# Patient Record
Sex: MALE | Race: OTHER | NOT HISPANIC OR LATINO
[De-identification: names, ages, dates, MRNs, and addresses within clinical notes are randomized per-mention and may not be internally consistent; named-entity substitution may affect disease eponyms.]

---

## 2022-10-19 PROBLEM — Z00.00 ENCOUNTER FOR PREVENTIVE HEALTH EXAMINATION: Status: ACTIVE | Noted: 2022-10-19

## 2022-10-25 ENCOUNTER — APPOINTMENT (OUTPATIENT)
Dept: UROLOGY | Facility: CLINIC | Age: 57
End: 2022-10-25

## 2022-10-25 DIAGNOSIS — I10 ESSENTIAL (PRIMARY) HYPERTENSION: ICD-10-CM

## 2022-10-25 DIAGNOSIS — Z82.49 FAMILY HISTORY OF ISCHEMIC HEART DISEASE AND OTHER DISEASES OF THE CIRCULATORY SYSTEM: ICD-10-CM

## 2022-10-25 DIAGNOSIS — F17.200 NICOTINE DEPENDENCE, UNSPECIFIED, UNCOMPLICATED: ICD-10-CM

## 2022-10-25 DIAGNOSIS — Z80.0 FAMILY HISTORY OF MALIGNANT NEOPLASM OF DIGESTIVE ORGANS: ICD-10-CM

## 2022-10-25 DIAGNOSIS — Z78.9 OTHER SPECIFIED HEALTH STATUS: ICD-10-CM

## 2022-10-25 PROCEDURE — 99204 OFFICE O/P NEW MOD 45 MIN: CPT | Mod: 95

## 2022-10-25 RX ORDER — AMLODIPINE BESYLATE 5 MG/1
5 TABLET ORAL
Qty: 30 | Refills: 0 | Status: ACTIVE | COMMUNITY
Start: 2022-10-10

## 2022-10-25 RX ORDER — ATORVASTATIN CALCIUM 20 MG/1
20 TABLET, FILM COATED ORAL
Qty: 30 | Refills: 0 | Status: ACTIVE | COMMUNITY
Start: 2022-03-30

## 2022-10-25 RX ORDER — CHLORTHALIDONE 25 MG/1
25 TABLET ORAL
Qty: 15 | Refills: 0 | Status: ACTIVE | COMMUNITY
Start: 2022-10-10

## 2022-10-25 RX ORDER — LISINOPRIL 20 MG/1
20 TABLET ORAL
Qty: 30 | Refills: 0 | Status: ACTIVE | COMMUNITY
Start: 2022-03-30

## 2022-10-25 NOTE — PHYSICAL EXAM
[General Appearance - Well Developed] : well developed [General Appearance - Well Nourished] : well nourished [Normal Appearance] : normal appearance [Well Groomed] : well groomed [General Appearance - In No Acute Distress] : no acute distress [] : no respiratory distress [Respiration, Rhythm And Depth] : normal respiratory rhythm and effort [Exaggerated Use Of Accessory Muscles For Inspiration] : no accessory muscle use [No Focal Deficits] : no focal deficits

## 2022-10-25 NOTE — ASSESSMENT
[FreeTextEntry1] : 57 year old  man with elevated PSA 6.03 ng/ml. MRI on 10/10/22 demonstrated a PIRADS 5 lesion left mid PZ with gross EPE in the left posterolateral apex. Elevated PSAD 0.27 ng/ml/cc, biopsy naive, neg FH. \par \par 1. Book for biopsy- discussed indication, prep, procedure, expected SE, risks and recovery\par 2. Need MRI images uploaded- RDP team aware\par 3. PSMA PET/CT after biopsy \par \par He understands that many men with prostate cancer will die with the disease rather than of it and we also discussed the results large multi-center American and  prostate cancer screening trials. He also understands that PSA in and of itself does not diagnose prostate cancer but only assesses risk to a certain degree. The patient understands that to definitively screen for prostate cancer, a biopsy is required and this procedure has risks, including bleeding, infection, ED and urinary retention. The patient opted to move forward with the biopsy.\par \par The patient is aware to expect hematuria x 2 weeks and upto 4 weeks of hematospermia.  There is a risk of infection albeit much lower than a transrectal approach. In some cases patients can experience erectile dysfunction but this is usually self limiting.  Any fever/chills after the biopsy the patient is to contact the office and go to the ER for an immediate evaluation. He has been given paper instructions outlining these items - which includes medications to avoid prior to surgery.\par \par 1. CBC, BMP, PSA (need 2nd), Covid Test, UA UCx. EKG, CXR (current smoker 20 pack year hx)\par 2. Medical Clearance\par 3. TP biopsy at [facility]\par 4. follow up 2 weeks after biopsy with his primary urologist or ourselves.\par 5. we will call with the path results once they are resulted.\par \par Follow up in office for MRI review\par \par \par Halina Li NP, was in the office during the entirety of this telemedicine visit.\par \par

## 2022-10-25 NOTE — HISTORY OF PRESENT ILLNESS
[Home] : at home, [unfilled] , at the time of the visit. [Medical Office: ([facility])___] : at the medical office located in  [Verbal consent obtained from patient] : the patient, [unfilled] [FreeTextEntry1] : Dear Dr Montalvo (Urologist),\par Dear Dr Monaco (PCP)\par \par Thank you so much for the referral to help care for your patient.\par \par Chief Complaint: Elevated PSA\par Date of first visit: 10/25/2022\par Occupation: \par \par DORETHA ARORA is a 57 year old  man with PMHx HTN, microscopic hematuria (worked up with Dr Montalvo) who presents for elevated PSA. His PSA is 6.03 ng/ml. MRI on 10/10/22 demonstrated a PIRADS 5 lesion left mid PZ with gross EPE in the left posterolateral apex. His PSA density is elevated (0.27 ng/ml/cc). He is biopsy naive and denies family hx of  malignancies. \par \par He takes 3 medications but is unsure of the names or what they are for. He thinks one is for blood pressure.\par \par Overall feels physically well today. No urinary complaints. \par \par PSA History\par 6.03 9/14/22. PSAD 0.27 ng/ml/cc\par \par MRI History\par MRI at [facility] on 10/10/2022.  22.9 cc prostate with PIRADS 5 lesion measuring 14 x 6mm, left mid pz (series 7 image 21) with overlying gross EPE in the left posterolateral apex (series 7 image 17).No LAD, No Bony Lesions.  The clinical implications discussed with the patient.\par \par 10/25/2022\par IPSS 0 QOL 0\par IIEF:\par ED Function Score: 3/30 (NSA)\par Orgasmic Function Score: 0\par Sexual Desire Score: 6/10\par [place] Satisfaction: 0/10\par Overall Satisfaction: 9/10\par \par Prostate cancer screening: the patient and I spoke at length about prostate cancer screening, its risks and its benefits. The patient has 2 (age, PSA) risk factors for prostate cancer. He understands that many men with prostate cancer will die with the disease rather than of it and we also discussed the results large multi-center American and  prostate cancer screening trials. He also understands that PSA in and of itself does not diagnose prostate cancer but only assesses risk to a certain degree.The patient understands that to definitively screen for prostate cancer, a biopsy is required and this procedure has risks, including bleeding, infection, ED and urinary retention. The patient opted to proceed with a fusion biopsy.\par  \par The patient denies fevers, chills, nausea and or vomiting and no unexplained weight loss.\par \par All pertinent laboratory, films and physician notes were reviewed. Questionnaire results were discussed with patient.

## 2022-11-07 ENCOUNTER — APPOINTMENT (OUTPATIENT)
Dept: UROLOGY | Facility: CLINIC | Age: 57
End: 2022-11-07

## 2022-11-07 VITALS
HEIGHT: 73 IN | HEART RATE: 102 BPM | SYSTOLIC BLOOD PRESSURE: 135 MMHG | DIASTOLIC BLOOD PRESSURE: 90 MMHG | WEIGHT: 215 LBS | BODY MASS INDEX: 28.49 KG/M2 | TEMPERATURE: 98.6 F

## 2022-11-07 DIAGNOSIS — R97.20 ELEVATED PROSTATE, SPECIFIC ANTIGEN [PSA]: ICD-10-CM

## 2022-11-07 PROCEDURE — 99214 OFFICE O/P EST MOD 30 MIN: CPT

## 2022-11-07 RX ORDER — TAMSULOSIN HYDROCHLORIDE 0.4 MG/1
0.4 CAPSULE ORAL
Qty: 90 | Refills: 3 | Status: ACTIVE | COMMUNITY
Start: 2022-11-07 | End: 1900-01-01

## 2022-11-08 LAB
BASOPHILS # BLD AUTO: 0.05 K/UL
BASOPHILS NFR BLD AUTO: 0.5 %
EOSINOPHIL # BLD AUTO: 0.07 K/UL
EOSINOPHIL NFR BLD AUTO: 0.8 %
HCT VFR BLD CALC: 48 %
HGB BLD-MCNC: 16 G/DL
IMM GRANULOCYTES NFR BLD AUTO: 0.3 %
LYMPHOCYTES # BLD AUTO: 2.4 K/UL
LYMPHOCYTES NFR BLD AUTO: 26.1 %
MAN DIFF?: NORMAL
MCHC RBC-ENTMCNC: 33.3 GM/DL
MCHC RBC-ENTMCNC: 33.4 PG
MCV RBC AUTO: 100.2 FL
MONOCYTES # BLD AUTO: 0.77 K/UL
MONOCYTES NFR BLD AUTO: 8.4 %
NEUTROPHILS # BLD AUTO: 5.89 K/UL
NEUTROPHILS NFR BLD AUTO: 63.9 %
PLATELET # BLD AUTO: 283 K/UL
PSA FREE FLD-MCNC: 9 %
PSA FREE SERPL-MCNC: 0.55 NG/ML
PSA SERPL-MCNC: 5.99 NG/ML
RBC # BLD: 4.79 M/UL
RBC # FLD: 14.6 %
WBC # FLD AUTO: 9.21 K/UL

## 2022-11-08 NOTE — ADDENDUM
[FreeTextEntry1] : IRB   MR/TRUS FUSION GUIDED PROSTATE BIOPSY- AN IMPROVED WAY TO DETECT AND QUANTIFY PROSTATE CANCER\par \par Inclusion criteria have been reviewed and it has been determined that the subject has met all inclusion criteria.\par Exclusion criteria have been reviewed and it has been determined that the subject does not meet any exclusion criteria.\par \par The following was discussed during the consent process:\par ·	The fact that the study involves research\par ·	The study schedule and procedures involved\par ·	The main risks of the study, and the fact that all risks may not be known at this time\par ·	New information that may affect the subject’s willingness to continue on the study will be presented as soon as it is available\par ·	Benefits of participating\par ·	Alternatives to participating\par ·	Confidentiality \par ·	Compensation for research-related injury\par ·	Contacts for questions about the study or their rights while on the study\par ·	The fact that the subject’s participation is voluntary - they can refuse or withdraw \par at any time without penalty or loss of benefits.\par \par The subject was given ample time to ask questions prior to signing - all questions were answered to the subject’s satisfaction.\par \par Contact information for research staff was given to the subject.	\par The subject has expressed his/her willingness to participate.	\par \par Opportunity to sign the consent electronically was offered to the subject. Study team to contact the subject to assist with e-consenting though the [employer] platform. \par \par OR  \par \par Subject will sign printed consent on day of procedure.  \par \par

## 2022-11-08 NOTE — ASSESSMENT
[FreeTextEntry1] : 57 year old  man with elevated PSA 6.03 ng/ml. MRI on 10/10/22 demonstrated a PIRADS 5 lesion left mid PZ with gross EPE in the left posterolateral apex. Elevated PSAD 0.27 ng/ml/cc, biopsy naive, neg FH. \par \par BPH/Nocturia\par - Behavioral modifications discussed\par - Start tamsulosin 0.4mg qhs - The patient understands that this medication may cause dizziness, retrograde ejaculation or nasal congestion among other complications. I recommended that the patient take this medication at night. If the side effects become too bothersome, the medication can be discontinued. \par \par Elevated PSA\par - Book for biopsy- discussed indication, prep, procedure, expected SE, risks and recovery\par - MRI images uploaded\par - PSMA PET/CT after biopsy \par \par He understands that many men with prostate cancer will die with the disease rather than of it and we also discussed the results large multi-center American and  prostate cancer screening trials. He also understands that PSA in and of itself does not diagnose prostate cancer but only assesses risk to a certain degree. The patient understands that to definitively screen for prostate cancer, a biopsy is required and this procedure has risks, including bleeding, infection, ED and urinary retention. The patient opted to move forward with the biopsy.\par \par The patient is aware to expect hematuria x 2 weeks and upto 4 weeks of hematospermia.  There is a risk of infection albeit much lower than a transrectal approach. In some cases patients can experience erectile dysfunction but this is usually self limiting.  Any fever/chills after the biopsy the patient is to contact the office and go to the ER for an immediate evaluation. He has been given paper instructions outlining these items - which includes medications to avoid prior to surgery.\par \par 1. CBC, BMP, PSA (need 2nd), Covid Test, UA UCx. EKG, CXR (current smoker 20 pack year hx)\par 2. Medical Clearance\par 3. TP biopsy at [facility]\par 4. follow up 2 weeks after biopsy with his primary urologist or ourselves.\par 5. we will call with the path results once they are resulted.\par \par Thank you very much for allowing me to assist in the care of this patient. Should you have any additional questions or concerns please do not hesitate to contact me.\par \par \par Sincerely,\par \par \par Kale Beach D.O.\par  of Urology and Radiology\par  of Urology at [facility]\par System Director for Prostate Cancer\par [address] [place] [address], 5th Floor [facility], [address]\par Phone: [phone]\par \par \par \par \par

## 2022-11-08 NOTE — HISTORY OF PRESENT ILLNESS
[FreeTextEntry1] : Dear Dr Montalvo (Urologist),\par Dear Dr Monaco (PCP)\par \par Thank you so much for the referral to help care for your patient.\par \par Chief Complaint: Elevated PSA\par Date of first visit: 10/25/2022\par Occupation: \par \par DORETHA ARORA is a 57 year old  man with PMHx HTN, microscopic hematuria (worked up with Dr Montalvo) who presents for elevated PSA. His PSA is 6.03 ng/ml. MRI on 10/10/22 demonstrated a PIRADS 5 lesion left mid PZ with gross EPE in the left posterolateral apex. His PSA density is elevated (0.27 ng/ml/cc). He is biopsy naive and denies family hx of  malignancies. \par \par He takes 3 medications but is unsure of the names or what they are for. He thinks one is for blood pressure.\par \par Overall feels physically well today. Reports nocturia x2-3.  Has one cup of coffee per day.  Never been on an alpha blocker.  \par \par PSA History\par 6.03 9/14/22. PSAD 0.27 ng/ml/cc\par \par MRI History\par MRI at [facility] on 10/10/2022.  22.9 cc prostate with PIRADS 5 lesion measuring 14 x 6mm, left mid pz (series 7 image 21) with overlying gross EPE in the left posterolateral apex (series 7 image 17).No LAD, No Bony Lesions.  The clinical implications discussed with the patient.\par \par 11/07/2022\par IPSS   QOL\par [place]ILL[place]\par \par 10/25/2022\par IPSS 0 QOL 0\par IIEF:\par ED Function Score: 3/30 (NSA)\par Orgasmic Function Score: 0\par Sexual Desire Score: 6/10\par [place] Satisfaction: 0/10\par Overall Satisfaction: 9/10\par \par Prostate cancer screening: the patient and I spoke at length about prostate cancer screening, its risks and its benefits. The patient has 2 (age, PSA) risk factors for prostate cancer. He understands that many men with prostate cancer will die with the disease rather than of it and we also discussed the results large multi-center American and  prostate cancer screening trials. He also understands that PSA in and of itself does not diagnose prostate cancer but only assesses risk to a certain degree.The patient understands that to definitively screen for prostate cancer, a biopsy is required and this procedure has risks, including bleeding, infection, ED and urinary retention. The patient opted to proceed with a fusion biopsy.\par  \par The patient denies fevers, chills, nausea and or vomiting and no unexplained weight loss.\par \par All pertinent laboratory, films and physician notes were reviewed. Questionnaire results were discussed with patient.\par \par I spent 31 minutes of non-face to face time reviewing the patient's records, chart, uploading processing MR images, transferring MR images from PACS to an independent workstation, reviewing images on independent workstation, speaking with their physician and planning their prostate biopsy and preparation of an upcoming visit for 11/07/2022 .  The imaging and planning was highly complex and took this entire time. \par \par \par

## 2022-11-09 ENCOUNTER — NON-APPOINTMENT (OUTPATIENT)
Age: 57
End: 2022-11-09

## 2022-11-09 LAB
ANION GAP SERPL CALC-SCNC: 17 MMOL/L
APPEARANCE: CLEAR
BACTERIA UR CULT: NORMAL
BACTERIA: NEGATIVE
BILIRUBIN URINE: NEGATIVE
BLOOD URINE: NEGATIVE
BUN SERPL-MCNC: 24 MG/DL
CALCIUM SERPL-MCNC: 10.1 MG/DL
CHLORIDE SERPL-SCNC: 98 MMOL/L
CO2 SERPL-SCNC: 24 MMOL/L
COLOR: NORMAL
CREAT SERPL-MCNC: 1.87 MG/DL
EGFR: 42 ML/MIN/1.73M2
GLUCOSE QUALITATIVE U: NEGATIVE
GLUCOSE SERPL-MCNC: 103 MG/DL
HYALINE CASTS: 0 /LPF
KETONES URINE: NEGATIVE
LEUKOCYTE ESTERASE URINE: NEGATIVE
MICROSCOPIC-UA: NORMAL
NITRITE URINE: NEGATIVE
PH URINE: 6
POTASSIUM SERPL-SCNC: 3.9 MMOL/L
PROTEIN URINE: ABNORMAL
RED BLOOD CELLS URINE: 0 /HPF
SODIUM SERPL-SCNC: 139 MMOL/L
SPECIFIC GRAVITY URINE: 1.01
SQUAMOUS EPITHELIAL CELLS: 0 /HPF
UROBILINOGEN URINE: NORMAL
WHITE BLOOD CELLS URINE: 0 /HPF

## 2022-11-11 ENCOUNTER — OUTPATIENT (OUTPATIENT)
Dept: OUTPATIENT SERVICES | Facility: HOSPITAL | Age: 57
LOS: 1 days | End: 2022-11-11
Payer: SELF-PAY

## 2022-11-11 DIAGNOSIS — Z00.8 ENCOUNTER FOR OTHER GENERAL EXAMINATION: ICD-10-CM

## 2022-11-11 PROCEDURE — C8001: CPT

## 2022-11-29 LAB — SARS-COV-2 N GENE NPH QL NAA+PROBE: NOT DETECTED

## 2022-12-01 ENCOUNTER — APPOINTMENT (OUTPATIENT)
Dept: UROLOGY | Facility: AMBULATORY SURGERY CENTER | Age: 57
End: 2022-12-01

## 2022-12-05 ENCOUNTER — APPOINTMENT (OUTPATIENT)
Dept: UROLOGY | Facility: CLINIC | Age: 57
End: 2022-12-05

## 2022-12-21 ENCOUNTER — TRANSCRIPTION ENCOUNTER (OUTPATIENT)
Age: 57
End: 2022-12-21

## 2022-12-21 NOTE — ASU PATIENT PROFILE, ADULT - FALL HARM RISK - UNIVERSAL INTERVENTIONS
Bed in lowest position, wheels locked, appropriate side rails in place/Call bell, personal items and telephone in reach/Instruct patient to call for assistance before getting out of bed or chair/Non-slip footwear when patient is out of bed/[place] to call system/Physically safe environment - no spills, clutter or unnecessary equipment/Purposeful Proactive Rounding/Room/bathroom lighting operational, light cord in reach

## 2022-12-21 NOTE — ASU PATIENT PROFILE, ADULT - NS PRO INFO GIVEN TO
per pt was told to go to [facility]  [place] AM first thing to have results back in time for afternoon/patient

## 2022-12-22 ENCOUNTER — APPOINTMENT (OUTPATIENT)
Dept: UROLOGY | Facility: AMBULATORY SURGERY CENTER | Age: 57
End: 2022-12-22

## 2022-12-22 ENCOUNTER — TRANSCRIPTION ENCOUNTER (OUTPATIENT)
Age: 57
End: 2022-12-22

## 2022-12-22 ENCOUNTER — RESULT REVIEW (OUTPATIENT)
Age: 57
End: 2022-12-22

## 2022-12-22 ENCOUNTER — OUTPATIENT (OUTPATIENT)
Dept: OUTPATIENT SERVICES | Facility: HOSPITAL | Age: 57
LOS: 1 days | Discharge: HOME | End: 2022-12-22
Payer: MEDICAID

## 2022-12-22 VITALS
OXYGEN SATURATION: 96 % | TEMPERATURE: 98 F | HEART RATE: 79 BPM | DIASTOLIC BLOOD PRESSURE: 78 MMHG | RESPIRATION RATE: 17 BRPM | SYSTOLIC BLOOD PRESSURE: 143 MMHG

## 2022-12-22 VITALS
TEMPERATURE: 99 F | DIASTOLIC BLOOD PRESSURE: 87 MMHG | HEIGHT: 72 IN | RESPIRATION RATE: 20 BRPM | OXYGEN SATURATION: 97 % | HEART RATE: 101 BPM | WEIGHT: 209 LBS | SYSTOLIC BLOOD PRESSURE: 148 MMHG

## 2022-12-22 DIAGNOSIS — Z98.890 OTHER SPECIFIED POSTPROCEDURAL STATES: Chronic | ICD-10-CM

## 2022-12-22 DIAGNOSIS — Z98.1 ARTHRODESIS STATUS: Chronic | ICD-10-CM

## 2022-12-22 LAB — SARS-COV-2 RNA SPEC QL NAA+PROBE: NEGATIVE — SIGNIFICANT CHANGE UP

## 2022-12-22 PROCEDURE — 76377 3D RENDER W/INTRP POSTPROCES: CPT | Mod: 26

## 2022-12-22 PROCEDURE — 76872 US TRANSRECTAL: CPT | Mod: 26

## 2022-12-22 PROCEDURE — G0416: CPT | Mod: 26,1L

## 2022-12-22 PROCEDURE — 55700: CPT | Mod: 22

## 2022-12-22 RX ORDER — SODIUM CHLORIDE 9 MG/ML
1000 INJECTION, SOLUTION INTRAVENOUS
Refills: 0 | Status: DISCONTINUED | OUTPATIENT
Start: 2022-12-22 | End: 2022-12-22

## 2022-12-22 RX ORDER — ACETAMINOPHEN 500 MG
1000 TABLET ORAL ONCE
Refills: 0 | Status: DISCONTINUED | OUTPATIENT
Start: 2022-12-22 | End: 2022-12-22

## 2022-12-22 NOTE — ASU DISCHARGE PLAN (ADULT/PEDIATRIC) - ASU DC SPECIAL INSTRUCTIONSFT
Expect Blood in stool and urine for the next few days. Change dressing and keep covered as necessary with gauze and bacitracin. For pain take tylenol as directed on bottle every 4-6 hours for pain. Ice to area as needed.    ** see post op sheet **

## 2022-12-22 NOTE — ASU DISCHARGE PLAN (ADULT/PEDIATRIC) - CARE PROVIDER_API CALL
Yesy Beach (DO)  Urology  [address] [facility], [place] [facility]  Phone: [phone]  Fax: [phone]  Follow Up Time:

## 2022-12-22 NOTE — BRIEF OPERATIVE NOTE - NSICDXBRIEFPROCEDURE_GEN_ALL_CORE_FT
PROCEDURES:  Biopsy of prostate using magnetic resonance imaging-ultrasound fusion guidance 22-Dec-2022 16:50:12  Dinora Frey

## 2022-12-22 NOTE — PRE-ANESTHESIA EVALUATION ADULT - NSANTHOSAYNRD_GEN_A_CORE
No. MARGOTH screening performed.  STOP BANG Legend: 0-2 = LOW Risk; 3-4 = INTERMEDIATE Risk; 5-8 = HIGH Risk

## 2022-12-22 NOTE — ASU DISCHARGE PLAN (ADULT/PEDIATRIC) - CALL YOUR DOCTOR IF YOU HAVE ANY OF THE FOLLOWING:
Bleeding that does not stop/Swelling that gets worse/Pain not relieved by Medications/Unable to urinate

## 2022-12-23 ENCOUNTER — NON-APPOINTMENT (OUTPATIENT)
Age: 57
End: 2022-12-23

## 2023-01-03 ENCOUNTER — NON-APPOINTMENT (OUTPATIENT)
Age: 58
End: 2023-01-03

## 2023-01-03 DIAGNOSIS — C61 MALIGNANT NEOPLASM OF PROSTATE: ICD-10-CM

## 2023-01-03 LAB — SURGICAL PATHOLOGY STUDY: SIGNIFICANT CHANGE UP

## 2023-01-26 PROBLEM — I10 ESSENTIAL (PRIMARY) HYPERTENSION: Chronic | Status: ACTIVE | Noted: 2022-12-21

## 2023-01-26 PROBLEM — E78.5 HYPERLIPIDEMIA, UNSPECIFIED: Chronic | Status: ACTIVE | Noted: 2022-12-21

## 2023-02-02 ENCOUNTER — APPOINTMENT (OUTPATIENT)
Dept: NUCLEAR MEDICINE | Facility: HOSPITAL | Age: 58
End: 2023-02-02

## 2023-04-27 ENCOUNTER — NON-APPOINTMENT (OUTPATIENT)
Age: 58
End: 2023-04-27

## (undated) DEVICE — GRID BRACHYTHERAPY EZ 18G

## (undated) DEVICE — BALLOON ENDOCAVITY 2X14CM

## (undated) DEVICE — DRSG TELFA 3 X 8

## (undated) DEVICE — GLV 8 PROTEXIS (WHITE)

## (undated) DEVICE — NDL MAX CORE 18G X 25CM

## (undated) DEVICE — DRAPE MEDIUM SHEET 44" X 70"

## (undated) DEVICE — PREP CHLORAPREP HI-LITE ORANGE 26ML

## (undated) DEVICE — DRAPE TOWEL BLUE 17" X 24"

## (undated) DEVICE — NDL BIOPSY CHIBA 22G X 20CM

## (undated) DEVICE — PLASTIC SOLUTION BOWL 160Z

## (undated) DEVICE — SYR LUER LOK 50CC

## (undated) DEVICE — SYR CATH TIP 2 OZ

## (undated) DEVICE — NDL HYPO REGULAR BEVEL 25G X 1.5" (BLUE)

## (undated) DEVICE — SYR LUER LOK 10CC